# Patient Record
Sex: MALE | URBAN - METROPOLITAN AREA
[De-identification: names, ages, dates, MRNs, and addresses within clinical notes are randomized per-mention and may not be internally consistent; named-entity substitution may affect disease eponyms.]

---

## 2020-07-25 ENCOUNTER — TELEPHONE ENCOUNTER (OUTPATIENT)
Dept: URBAN - METROPOLITAN AREA CLINIC 13 | Facility: CLINIC | Age: 75
End: 2020-07-25

## 2020-07-26 ENCOUNTER — TELEPHONE ENCOUNTER (OUTPATIENT)
Dept: URBAN - METROPOLITAN AREA CLINIC 13 | Facility: CLINIC | Age: 75
End: 2020-07-26

## 2025-02-14 ENCOUNTER — OFFICE VISIT (OUTPATIENT)
Dept: URBAN - METROPOLITAN AREA CLINIC 113 | Facility: CLINIC | Age: 80
End: 2025-02-14

## 2025-03-04 ENCOUNTER — LAB OUTSIDE AN ENCOUNTER (OUTPATIENT)
Dept: URBAN - METROPOLITAN AREA CLINIC 113 | Facility: CLINIC | Age: 80
End: 2025-03-04

## 2025-03-04 ENCOUNTER — DASHBOARD ENCOUNTERS (OUTPATIENT)
Age: 80
End: 2025-03-04

## 2025-03-04 ENCOUNTER — OFFICE VISIT (OUTPATIENT)
Dept: URBAN - METROPOLITAN AREA CLINIC 113 | Facility: CLINIC | Age: 80
End: 2025-03-04
Payer: OTHER GOVERNMENT

## 2025-03-04 VITALS
DIASTOLIC BLOOD PRESSURE: 68 MMHG | TEMPERATURE: 97.8 F | HEART RATE: 71 BPM | BODY MASS INDEX: 31.5 KG/M2 | HEIGHT: 70 IN | WEIGHT: 220 LBS | SYSTOLIC BLOOD PRESSURE: 152 MMHG

## 2025-03-04 DIAGNOSIS — K21.9 GASTROESOPHAGEAL REFLUX DISEASE, UNSPECIFIED WHETHER ESOPHAGITIS PRESENT: ICD-10-CM

## 2025-03-04 DIAGNOSIS — R74.8 ELEVATED ALKALINE PHOSPHATASE LEVEL: ICD-10-CM

## 2025-03-04 DIAGNOSIS — K76.0 HEPATIC STEATOSIS: ICD-10-CM

## 2025-03-04 PROBLEM — 197321007: Status: ACTIVE | Noted: 2025-03-04

## 2025-03-04 PROBLEM — 235595009: Status: ACTIVE | Noted: 2025-03-04

## 2025-03-04 PROCEDURE — 99204 OFFICE O/P NEW MOD 45 MIN: CPT

## 2025-03-04 PROCEDURE — 99244 OFF/OP CNSLTJ NEW/EST MOD 40: CPT

## 2025-03-04 RX ORDER — ROSUVASTATIN CALCIUM 20 MG/1
1 TABLET TABLET, COATED ORAL ONCE A DAY
Status: ACTIVE | COMMUNITY
Start: 2025-03-04

## 2025-03-04 RX ORDER — EZETIMIBE 10 MG/1
1 TABLET TABLET ORAL ONCE A DAY
Status: ACTIVE | COMMUNITY
Start: 2025-03-04

## 2025-03-04 RX ORDER — PANTOPRAZOLE SODIUM 40 MG/1
1 TABLET 1/2 TO 1 HOUR BEFORE MORNING MEAL TABLET, DELAYED RELEASE ORAL ONCE A DAY
Status: ACTIVE | COMMUNITY
Start: 2025-03-04

## 2025-03-04 RX ORDER — AMLODIPINE BESYLATE 5 MG/1
1 TABLET TABLET ORAL ONCE A DAY
Status: ACTIVE | COMMUNITY
Start: 2025-03-04

## 2025-03-04 NOTE — HPI-TODAY'S VISIT:
Mr. 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 Don Rivas for evaluation of elevated liver enzymes.  A copy of today's visit will be forwarded to referring provider.  Labs available for review from 10/17/2024 show hemoglobin A1c 5.9%, serum creatinine 1.06, BUN 14, ALT 25, AST 24, total bilirubin 0.4, hemoglobin 12.7, platelet count 3 27,000, alkaline phosphatase 124, fractionated alkaline phosphatase shows liver isoenzymes 77, pulmonary symptoms 23, he arrived a right upper quadrant ultrasound which showed enlarged liver with coarsened echotexture and increased echogenicity, nonspecific most commonly reflects hepatic steatosis.  Today he reports he feels well overall. Denies any alcohol use since 1988. He denies family history of liver disease. He denies abdominal pain, nausea, vomiting, or fevers. His bowels are moving regularly without blood per rectum or melena. There is no jaundice or icterus.

## 2025-03-17 ENCOUNTER — TELEPHONE ENCOUNTER (OUTPATIENT)
Dept: URBAN - METROPOLITAN AREA CLINIC 113 | Facility: CLINIC | Age: 80
End: 2025-03-17

## 2025-04-25 ENCOUNTER — OFFICE VISIT (OUTPATIENT)
Dept: URBAN - METROPOLITAN AREA CLINIC 113 | Facility: CLINIC | Age: 80
End: 2025-04-25
Payer: OTHER GOVERNMENT

## 2025-04-25 DIAGNOSIS — K76.0 HEPATIC STEATOSIS: ICD-10-CM

## 2025-04-25 DIAGNOSIS — K21.9 GASTROESOPHAGEAL REFLUX DISEASE, UNSPECIFIED WHETHER ESOPHAGITIS PRESENT: ICD-10-CM

## 2025-04-25 DIAGNOSIS — R79.89 ELEVATED LFTS: ICD-10-CM

## 2025-04-25 PROCEDURE — 99213 OFFICE O/P EST LOW 20 MIN: CPT

## 2025-04-25 RX ORDER — PANTOPRAZOLE SODIUM 40 MG/1
1 TABLET 1/2 TO 1 HOUR BEFORE MORNING MEAL TABLET, DELAYED RELEASE ORAL ONCE A DAY
Status: ACTIVE | COMMUNITY
Start: 2025-03-04

## 2025-04-25 RX ORDER — AMLODIPINE BESYLATE 5 MG/1
1 TABLET TABLET ORAL ONCE A DAY
Status: ACTIVE | COMMUNITY
Start: 2025-03-04

## 2025-04-25 RX ORDER — EZETIMIBE 10 MG/1
1 TABLET TABLET ORAL ONCE A DAY
Status: ACTIVE | COMMUNITY
Start: 2025-03-04

## 2025-04-25 RX ORDER — ROSUVASTATIN CALCIUM 20 MG/1
1 TABLET TABLET, COATED ORAL ONCE A DAY
Status: ACTIVE | COMMUNITY
Start: 2025-03-04

## 2025-04-25 NOTE — HPI-TODAY'S VISIT:
Mr. 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 Mendosa), hypertension, hepatic steatosis presenting for follow-up.  He was last seen in office on 3/4/2025 for evaluation of elevation of alkaline phosphatase with recent ultrasound showing hepatic steatosis and mild hepatomegaly with a fractionated alkaline phosphatase revealing hepatic origin.  His LFTs will be repeated and MRI/MRCP scheduled for further evaluation.  Given his treatment for his leukemia, consider elevation could be related to diagnosis versus therapy.  GERD symptoms well-controlled with daily PPI therapy.  His MRI abdomen with and without contrast performed 4/2/2025 showed findings suggestive of mild hepatic steatosis.  A benign 9 mm cyst in the central left hepatic lobe.  A 3.7 cm curvilinear consolidation in the posterior medial basilar aspect of the lower lobe of the right lung which is new since 7/6/2022, favored to represent scarring or atelectasis however neoplasm is within the differential and recommendation for CT of the chest recommended.  Labs available for review from 4/24/2025 show alkaline phosphatase 92, ALT 29, AST 76, total bilirubin 0.3  Today he reports he is feeling well overall. He has a few more months left on treatment for his leukemia. Denies abodminal pain, weight loss, nausea, vomiting, or fevers. [facility]. Bowels are moving regularly without blood per rectum or melena. There is no jaundice or icterus.

## 2025-05-01 ENCOUNTER — TELEPHONE ENCOUNTER (OUTPATIENT)
Dept: URBAN - METROPOLITAN AREA CLINIC 113 | Facility: CLINIC | Age: 80
End: 2025-05-01

## 2025-05-21 ENCOUNTER — TELEPHONE ENCOUNTER (OUTPATIENT)
Dept: URBAN - METROPOLITAN AREA CLINIC 113 | Facility: CLINIC | Age: 80
End: 2025-05-21